# Patient Record
Sex: FEMALE | Race: WHITE | NOT HISPANIC OR LATINO | URBAN - METROPOLITAN AREA
[De-identification: names, ages, dates, MRNs, and addresses within clinical notes are randomized per-mention and may not be internally consistent; named-entity substitution may affect disease eponyms.]

---

## 2020-06-23 ENCOUNTER — CLAIMS CREATED FROM THE CLAIM WINDOW (OUTPATIENT)
Dept: URBAN - METROPOLITAN AREA CLINIC 4 | Facility: CLINIC | Age: 55
End: 2020-06-23
Payer: SELF-PAY

## 2020-06-23 ENCOUNTER — TELEPHONE ENCOUNTER (OUTPATIENT)
Dept: URBAN - METROPOLITAN AREA CLINIC 70 | Facility: CLINIC | Age: 55
End: 2020-06-23

## 2020-06-23 ENCOUNTER — OFFICE VISIT (OUTPATIENT)
Dept: URBAN - METROPOLITAN AREA SURGERY CENTER 24 | Facility: SURGERY CENTER | Age: 55
End: 2020-06-23
Payer: SELF-PAY

## 2020-06-23 DIAGNOSIS — R11.2 NAUSEA WITH VOMITING, UNSPECIFIED: ICD-10-CM

## 2020-06-23 DIAGNOSIS — K31.89 ACQUIRED DEFORMITY OF PYLORUS: ICD-10-CM

## 2020-06-23 DIAGNOSIS — R12 BURNING REFLUX: ICD-10-CM

## 2020-06-23 DIAGNOSIS — R10.9 UNSPECIFIED ABDOMINAL PAIN: ICD-10-CM

## 2020-06-23 DIAGNOSIS — D50.9 IRON DEFICIENCY ANEMIA, UNSPECIFIED: ICD-10-CM

## 2020-06-23 PROCEDURE — 43239 EGD BIOPSY SINGLE/MULTIPLE: CPT | Performed by: INTERNAL MEDICINE

## 2020-06-23 PROCEDURE — 88312 SPECIAL STAINS GROUP 1: CPT | Performed by: PATHOLOGY

## 2020-06-23 PROCEDURE — 88305 TISSUE EXAM BY PATHOLOGIST: CPT | Performed by: PATHOLOGY

## 2020-06-23 PROCEDURE — G8907 PT DOC NO EVENTS ON DISCHARG: HCPCS | Performed by: INTERNAL MEDICINE

## 2020-07-10 ENCOUNTER — OFFICE VISIT (OUTPATIENT)
Dept: URBAN - METROPOLITAN AREA CLINIC 70 | Facility: CLINIC | Age: 55
End: 2020-07-10
Payer: SELF-PAY

## 2020-07-10 DIAGNOSIS — K21.9 GERD: ICD-10-CM

## 2020-07-10 PROCEDURE — 3017F COLORECTAL CA SCREEN DOC REV: CPT | Performed by: INTERNAL MEDICINE

## 2020-07-10 PROCEDURE — G8427 DOCREV CUR MEDS BY ELIG CLIN: HCPCS | Performed by: INTERNAL MEDICINE

## 2020-07-10 PROCEDURE — G9903 PT SCRN TBCO ID AS NON USER: HCPCS | Performed by: INTERNAL MEDICINE

## 2020-07-10 PROCEDURE — 99213 OFFICE O/P EST LOW 20 MIN: CPT | Performed by: INTERNAL MEDICINE

## 2020-07-10 PROCEDURE — G8417 CALC BMI ABV UP PARAM F/U: HCPCS | Performed by: INTERNAL MEDICINE

## 2020-07-10 PROCEDURE — 1036F TOBACCO NON-USER: CPT | Performed by: INTERNAL MEDICINE

## 2020-07-10 RX ORDER — PANTOPRAZOLE SODIUM 40 MG/1
1 TABLET TABLET, DELAYED RELEASE ORAL ONCE A DAY
Qty: 30 TABLET | Refills: 12 | OUTPATIENT

## 2020-07-10 RX ORDER — DEXTROAMPHETAMINE/AMPHETAMINE 10 MG
1 CAPSULE IN THE MORNING CAPSULE, EXT RELEASE 24 HR ORAL ONCE A DAY
Status: ACTIVE | COMMUNITY

## 2020-07-31 ENCOUNTER — OFFICE VISIT (OUTPATIENT)
Dept: URBAN - METROPOLITAN AREA CLINIC 70 | Facility: CLINIC | Age: 55
End: 2020-07-31

## 2021-04-16 ENCOUNTER — DASHBOARD ENCOUNTERS (OUTPATIENT)
Age: 56
End: 2021-04-16

## 2021-04-16 ENCOUNTER — OFFICE VISIT (OUTPATIENT)
Dept: URBAN - METROPOLITAN AREA CLINIC 70 | Facility: CLINIC | Age: 56
End: 2021-04-16
Payer: SELF-PAY

## 2021-04-16 ENCOUNTER — WEB ENCOUNTER (OUTPATIENT)
Dept: URBAN - METROPOLITAN AREA CLINIC 70 | Facility: CLINIC | Age: 56
End: 2021-04-16

## 2021-04-16 VITALS
SYSTOLIC BLOOD PRESSURE: 129 MMHG | DIASTOLIC BLOOD PRESSURE: 85 MMHG | TEMPERATURE: 97.4 F | HEIGHT: 63 IN | WEIGHT: 201 LBS | HEART RATE: 90 BPM | BODY MASS INDEX: 35.61 KG/M2

## 2021-04-16 DIAGNOSIS — K57.30 DIVERTICULOSIS OF COLON: ICD-10-CM

## 2021-04-16 DIAGNOSIS — K21.9 GERD: ICD-10-CM

## 2021-04-16 PROBLEM — 733657002: Status: ACTIVE | Noted: 2021-04-16

## 2021-04-16 PROBLEM — 235595009 GASTROESOPHAGEAL REFLUX DISEASE: Status: ACTIVE | Noted: 2021-04-16

## 2021-04-16 PROCEDURE — 99213 OFFICE O/P EST LOW 20 MIN: CPT | Performed by: REGISTERED NURSE

## 2021-04-16 RX ORDER — PANTOPRAZOLE SODIUM 40 MG/1
1 TABLET TABLET, DELAYED RELEASE ORAL ONCE A DAY
Qty: 90 TABLET | Refills: 3

## 2021-04-16 RX ORDER — PANTOPRAZOLE SODIUM 40 MG/1
1 TABLET TABLET, DELAYED RELEASE ORAL ONCE A DAY
Qty: 30 TABLET | Refills: 12 | Status: ACTIVE | COMMUNITY

## 2021-04-16 RX ORDER — DEXTROAMPHETAMINE/AMPHETAMINE 10 MG
1 CAPSULE IN THE MORNING CAPSULE, EXT RELEASE 24 HR ORAL ONCE A DAY
Status: DISCONTINUED | COMMUNITY

## 2021-04-16 NOTE — HPI-TODAY'S VISIT:
Pt recently had Covid and says she hasn't felt well since having it. She has had recurrent UTIs and had a CT scan done due to this. The CT scan showed diverticulosis without acute diverticulitis. She reports no abdominal pain, diarrhea or constipation. She does have GERD and is currently using omeprazole prn. She requests a refill of pantoprazole.  EGD 6/23/20 showed mild gastritis. Colonoscopy 4/5/16 showed diverticulosis.